# Patient Record
Sex: FEMALE | URBAN - METROPOLITAN AREA
[De-identification: names, ages, dates, MRNs, and addresses within clinical notes are randomized per-mention and may not be internally consistent; named-entity substitution may affect disease eponyms.]

---

## 2023-06-21 ENCOUNTER — APPOINTMENT (RX ONLY)
Dept: URBAN - METROPOLITAN AREA CLINIC 45 | Facility: CLINIC | Age: 73
Setting detail: DERMATOLOGY
End: 2023-06-21

## 2023-06-21 DIAGNOSIS — L20.89 OTHER ATOPIC DERMATITIS: ICD-10-CM

## 2023-06-21 DIAGNOSIS — L50.3 DERMATOGRAPHIC URTICARIA: ICD-10-CM

## 2023-06-21 PROBLEM — L20.84 INTRINSIC (ALLERGIC) ECZEMA: Status: ACTIVE | Noted: 2023-06-21

## 2023-06-21 PROCEDURE — ? TREATMENT REGIMEN

## 2023-06-21 PROCEDURE — 99203 OFFICE O/P NEW LOW 30 MIN: CPT

## 2023-06-21 PROCEDURE — ? PRESCRIPTION

## 2023-06-21 PROCEDURE — ? COUNSELING

## 2023-06-21 RX ORDER — HYDROCORTISONE ACETATE, IODOQUINOL 19; 10 MG/G; MG/G
CREAM TOPICAL
Qty: 30 | Refills: 12 | Status: ERX | COMMUNITY
Start: 2023-06-21

## 2023-06-21 RX ORDER — RUXOLITINIB 15 MG/G
CREAM TOPICAL
Qty: 60 | Refills: 3 | Status: ERX | COMMUNITY
Start: 2023-06-21

## 2023-06-21 RX ORDER — CLOBETASOL PROPIONATE 0.5 MG/G
AEROSOL, FOAM TOPICAL
Qty: 100 | Refills: 6 | Status: ERX | COMMUNITY
Start: 2023-06-21

## 2023-06-21 RX ADMIN — RUXOLITINIB: 15 CREAM TOPICAL at 00:00

## 2023-06-21 RX ADMIN — CLOBETASOL PROPIONATE: 0.5 AEROSOL, FOAM TOPICAL at 00:00

## 2023-06-21 RX ADMIN — HYDROCORTISONE ACETATE, IODOQUINOL: 19; 10 CREAM TOPICAL at 00:00

## 2023-06-21 ASSESSMENT — LOCATION ZONE DERM
LOCATION ZONE: FACE
LOCATION ZONE: ARM
LOCATION ZONE: HAND
LOCATION ZONE: SCALP
LOCATION ZONE: LEG

## 2023-06-21 ASSESSMENT — LOCATION SIMPLE DESCRIPTION DERM
LOCATION SIMPLE: RIGHT ELBOW
LOCATION SIMPLE: RIGHT ANKLE
LOCATION SIMPLE: LEFT ANKLE
LOCATION SIMPLE: LEFT HAND
LOCATION SIMPLE: LEFT ELBOW
LOCATION SIMPLE: RIGHT HAND
LOCATION SIMPLE: INFERIOR FOREHEAD
LOCATION SIMPLE: POSTERIOR SCALP

## 2023-06-21 ASSESSMENT — LOCATION DETAILED DESCRIPTION DERM
LOCATION DETAILED: LEFT RADIAL PALM
LOCATION DETAILED: LEFT ANKLE
LOCATION DETAILED: RIGHT ANKLE
LOCATION DETAILED: RIGHT ELBOW
LOCATION DETAILED: INFERIOR MID FOREHEAD
LOCATION DETAILED: LEFT ELBOW
LOCATION DETAILED: MID-OCCIPITAL SCALP
LOCATION DETAILED: RIGHT RADIAL PALM

## 2023-06-21 NOTE — PROCEDURE: TREATMENT REGIMEN
Plan: Location: body-face, hands,elbows, scalp,heels \\nTreatment: Opzelura 1.5%,Vytone 1.9%-1%, Tovet 0.05% foam\\nBSA: 7%\\nFailed: Tacrolimus, Protopic \\n\\n\\nPatient is here because she has possible eczema \\nPatient states that she is currently treating with Gold bond eczema relief ,and Cereva moisturizer \\nPatient states that she just recently relocated and is wanting treatment for her eczema \\n\\nAfter careful evaluation,advised the patient that there is some irritation to her skin and we will put her on a skin regimen to help improve her skin and alleviate the inflammation and irritation\\nDiscussed with patient that for her regimen she is to Take a double dose of generic oral antihistamine \\n             (Either Zyrtec,Allegra or Xyzal)\\n**In 3 weeks if hands have not improved go up to 3 oral antihistamine daily **\\Bj addition we will also send out some topicals to help with inflammation,\\n     Tovet Foam(Strong Steroid)- apply to body(hands,elbows,heels, scalp,etc.)\\n     Opzelura- apply to face in the morning \\n     Vytone (Mild steroid )- apply to face at night \\n\\nDiscussed with patient that If her skin has not improved we Will do a biopsy because her skin does show an indication of psoriasis so we will need to get a microscopic evaluation.\\n\\nEducated patient that eczema is an inflammatory condition triggered with stress, lack of sleep, and also has a genetic component\\nEducated patient to limit soap to only oily areas of the body to prevent stripping natural oils\\nRecommend using Cerave MC to help reestablish a healthy skin barrier\\nAlso recommended patient to take an antihistamine to help decrease histamine production\\n\\n\\nFollow up 4 weeks
Detail Level: Zone
Plan: Location: body\\n\\nDermatographism was drawn to detect histamine level. \\nPatient has high histamine level and advised to start taking Allegra BID, especially during allergy season.\\nF/u as needed

## 2023-07-19 ENCOUNTER — APPOINTMENT (RX ONLY)
Dept: URBAN - METROPOLITAN AREA CLINIC 45 | Facility: CLINIC | Age: 73
Setting detail: DERMATOLOGY
End: 2023-07-19

## 2023-07-19 DIAGNOSIS — L50.3 DERMATOGRAPHIC URTICARIA: ICD-10-CM

## 2023-07-19 DIAGNOSIS — L20.89 OTHER ATOPIC DERMATITIS: ICD-10-CM

## 2023-07-19 PROBLEM — D48.5 NEOPLASM OF UNCERTAIN BEHAVIOR OF SKIN: Status: ACTIVE | Noted: 2023-07-19

## 2023-07-19 PROCEDURE — ? TREATMENT REGIMEN

## 2023-07-19 PROCEDURE — 99213 OFFICE O/P EST LOW 20 MIN: CPT | Mod: 25

## 2023-07-19 PROCEDURE — ? BIOPSY BY SHAVE METHOD

## 2023-07-19 PROCEDURE — ? COUNSELING

## 2023-07-19 PROCEDURE — 11102 TANGNTL BX SKIN SINGLE LES: CPT

## 2023-07-19 PROCEDURE — ? PRESCRIPTION

## 2023-07-19 RX ORDER — RUXOLITINIB 15 MG/G
CREAM TOPICAL
Qty: 60 | Refills: 3 | Status: CANCELLED
Stop reason: CLARIF

## 2023-07-19 RX ORDER — HYDROCORTISONE ACETATE, IODOQUINOL 19; 10 MG/G; MG/G
CREAM TOPICAL
Qty: 30 | Refills: 12 | Status: CANCELLED
Stop reason: CLARIF

## 2023-07-19 RX ORDER — CLOBETASOL PROPIONATE 0.5 MG/G
AEROSOL, FOAM TOPICAL
Qty: 100 | Refills: 6 | Status: CANCELLED
Stop reason: CLARIF

## 2023-07-19 ASSESSMENT — LOCATION SIMPLE DESCRIPTION DERM
LOCATION SIMPLE: RIGHT ELBOW
LOCATION SIMPLE: POSTERIOR SCALP
LOCATION SIMPLE: RIGHT ANKLE
LOCATION SIMPLE: LEFT HAND
LOCATION SIMPLE: LEFT ELBOW
LOCATION SIMPLE: LEFT ANKLE
LOCATION SIMPLE: RIGHT HAND
LOCATION SIMPLE: INFERIOR FOREHEAD

## 2023-07-19 ASSESSMENT — LOCATION DETAILED DESCRIPTION DERM
LOCATION DETAILED: LEFT ANKLE
LOCATION DETAILED: RIGHT ANKLE
LOCATION DETAILED: LEFT RADIAL PALM
LOCATION DETAILED: RIGHT RADIAL PALM
LOCATION DETAILED: INFERIOR MID FOREHEAD
LOCATION DETAILED: LEFT ELBOW
LOCATION DETAILED: MID-OCCIPITAL SCALP
LOCATION DETAILED: RIGHT ELBOW

## 2023-07-19 ASSESSMENT — LOCATION ZONE DERM
LOCATION ZONE: ARM
LOCATION ZONE: HAND
LOCATION ZONE: SCALP
LOCATION ZONE: FACE
LOCATION ZONE: LEG

## 2023-07-19 NOTE — PROCEDURE: TREATMENT REGIMEN
Plan: Location: body-face, hands,elbows, scalp,heels \\nTreatment: Opzelura 1.5%,Vytone 1.9%-1%, Tovet 0.05% foam\\nBSA: 7%\\nFailed: Tacrolimus, Protopic \\n\\n\\nPictures Taken \\n7-19-23\\n\\n\\nPt is here for a follow up on her eczema.\\nShe states that since her Initial visit her eczema has resolved.\\nShe has been taking a double dose of Allegra daily.\\nAlong with using the topical prescriptions Vytone and Opzelura (compound) and  Clobetasol Foam.\\nOverall she comes in for further evaluation and treatment management.\\n\\nDiscussed with pt that upon further examination her eczema has resolved and does not currently present with any eczematous plaques.\\nToday, I will keep her on her current treatment regimen of Tovet Foam, Compounded topical of Opzelura, and Vytone.\\nInstructed her to continue taking her double dose of generic oral antihistamine Allegra.\\nEducated patient that eczema is an inflammatory condition triggered with stress, lack of sleep, and also has a genetic component\\nReiterated to pt to limit soap to only oily areas of the body to prevent stripping natural oils\\nRecommend using Cerave MC to help reestablish a healthy skin barrier\\n\\n\\nFollow up prn.
Detail Level: Zone
Plan: Location: body\\n\\nDermatographism was drawn to detect histamine level. \\nPatient has high histamine level and advised to start taking Allegra BID, especially during allergy season.\\nF/u as needed

## 2024-04-30 ENCOUNTER — RX ONLY (OUTPATIENT)
Age: 74
Setting detail: RX ONLY
End: 2024-04-30

## 2024-04-30 RX ORDER — RUXOLITINIB 15 MG/G
CREAM TOPICAL
Qty: 60 | Refills: 0 | Status: ERX

## 2024-11-07 ENCOUNTER — RX ONLY (OUTPATIENT)
Age: 74
Setting detail: RX ONLY
End: 2024-11-07

## 2024-11-07 ENCOUNTER — APPOINTMENT (RX ONLY)
Dept: URBAN - METROPOLITAN AREA CLINIC 45 | Facility: CLINIC | Age: 74
Setting detail: DERMATOLOGY
End: 2024-11-07

## 2024-11-07 DIAGNOSIS — H01.13 ECZEMATOUS DERMATITIS OF EYELID: ICD-10-CM

## 2024-11-07 DIAGNOSIS — L50.3 DERMATOGRAPHIC URTICARIA: ICD-10-CM

## 2024-11-07 DIAGNOSIS — L20.89 OTHER ATOPIC DERMATITIS: ICD-10-CM

## 2024-11-07 PROBLEM — H01.139 ECZEMATOUS DERMATITIS OF UNSPECIFIED EYE, UNSPECIFIED EYELID: Status: ACTIVE | Noted: 2024-11-07

## 2024-11-07 PROCEDURE — 99213 OFFICE O/P EST LOW 20 MIN: CPT

## 2024-11-07 PROCEDURE — ? COUNSELING

## 2024-11-07 PROCEDURE — ? TREATMENT REGIMEN

## 2024-11-07 RX ORDER — RUXOLITINIB 15 MG/G
CREAM TOPICAL
Qty: 60 | Refills: 6 | Status: ERX

## 2024-11-07 RX ORDER — CLOBETASOL PROPIONATE 0.5 MG/G
AEROSOL, FOAM TOPICAL
Qty: 100 | Refills: 6 | Status: ERX

## 2024-11-07 RX ORDER — HYDROCORTISONE ACETATE, IODOQUINOL 19; 10 MG/G; MG/G
CREAM TOPICAL
Qty: 30 | Refills: 12 | Status: ERX

## 2024-11-07 ASSESSMENT — LOCATION DETAILED DESCRIPTION DERM
LOCATION DETAILED: RIGHT ELBOW
LOCATION DETAILED: LEFT RADIAL PALM
LOCATION DETAILED: LEFT ANKLE
LOCATION DETAILED: LEFT ELBOW
LOCATION DETAILED: INFERIOR MID FOREHEAD
LOCATION DETAILED: MID-OCCIPITAL SCALP
LOCATION DETAILED: RIGHT RADIAL PALM
LOCATION DETAILED: RIGHT ANKLE

## 2024-11-07 ASSESSMENT — LOCATION SIMPLE DESCRIPTION DERM
LOCATION SIMPLE: RIGHT ELBOW
LOCATION SIMPLE: LEFT HAND
LOCATION SIMPLE: LEFT ELBOW
LOCATION SIMPLE: INFERIOR FOREHEAD
LOCATION SIMPLE: LEFT ANKLE
LOCATION SIMPLE: POSTERIOR SCALP
LOCATION SIMPLE: RIGHT ANKLE
LOCATION SIMPLE: RIGHT HAND

## 2024-11-07 ASSESSMENT — LOCATION ZONE DERM
LOCATION ZONE: ARM
LOCATION ZONE: SCALP
LOCATION ZONE: HAND
LOCATION ZONE: LEG
LOCATION ZONE: FACE

## 2024-11-07 NOTE — PROCEDURE: TREATMENT REGIMEN
Plan: Location: body-face, hands,elbows, scalp,heels \\nTreatment: Opzelura 1.5%,Vytone 1.9%-1%, Tovet 0.05% foam\\nBSA: 7%\\n\\nPt is here for a follow up on her eczema.\\nShe states that since her eczema has remained clear.\\nShe has been taking a double dose of Allegra daily.\\nAlong with using the topical prescriptions Vytone and Opzelura (compound) and  Clobetasol Foam.\\nOverall she comes in for further evaluation and treatment management.\\n\\nDiscussed with pt that upon further examination her eczema has resolved and does not currently present with any eczematous plaques.\\nToday, I will keep her on her current treatment regimen of Tovet Foam, Compounded topical of Opzelura, and Vytone.\\nInstructed her to continue taking her double dose of generic oral antihistamine Allegra.\\nEducated patient that eczema is an inflammatory condition triggered with stress, lack of sleep, and also has a genetic component\\nReiterated to pt to limit soap to only oily areas of the body to prevent stripping natural oils\\nRecommend using Cerave MC to help reestablish a healthy skin barrier\\n\\n\\nFollow up prn.
Detail Level: Zone
Plan: Location: body\\n\\nDermatographism was drawn to detect histamine level. \\nPatient has high histamine level and advised to start taking Allegra BID, especially during allergy season.\\nF/u as needed
Plan: Location: eyelids\\nPrescribe: Opzelura cream, Vytone topical\\n\\nPatient states he/she is very itchy and skin is thick and scaly.\\nPt states this has been bothering her for approx one month.\\nPt states she has been taking two Allegra daily \\nPt states she received steroid shot and 5 day course of prednisone from allergy doctor which showed improvement\\nPt states she still has Vytone and Opzelura from previous eczema treatment.\\nOverall pt is here for further evaluation and management \\n\\nDiscussed with patient this is an inflammatory condition of the skin, Normally onset with allergies or season changes, this condition is usually a genetic condition inherited by a parent. Discussed with pt that we will prescribe with Vytone cream or Elidel cream to help reduce inflammation.\\nDiscussed with pt to also use a good MC or HO such as CerVe afterward to help reestablish a healthy skin barrier.\\nDiscussed with patient to increase antihistamines up from 2 to 3 daily.\\n\\nPlan:\\n1. Apply Opzelura daily in the morning onto affected area of eyelids to decrease inflammation and irritation\\n2. Apply Vytone topical onto eyelids daily in the evening to soothe skin, follow with cerevae healing ointment\\n3. Increase to 3 antihistamines daily

## 2025-01-07 ENCOUNTER — RX ONLY (RX ONLY)
Age: 75
End: 2025-01-07

## 2025-01-07 RX ORDER — RUXOLITINIB 15 MG/G
CREAM TOPICAL
Qty: 60 | Refills: 3 | Status: ERX

## 2025-01-07 RX ORDER — CLOBETASOL PROPIONATE 0.5 MG/G
AEROSOL, FOAM TOPICAL
Qty: 100 | Refills: 6 | Status: CANCELLED
Stop reason: CLARIF

## 2025-03-20 ENCOUNTER — RX ONLY (RX ONLY)
Age: 75
End: 2025-03-20

## 2025-03-20 ENCOUNTER — APPOINTMENT (OUTPATIENT)
Dept: URBAN - METROPOLITAN AREA CLINIC 45 | Facility: CLINIC | Age: 75
Setting detail: DERMATOLOGY
End: 2025-03-20

## 2025-03-20 DIAGNOSIS — L50.3 DERMATOGRAPHIC URTICARIA: ICD-10-CM

## 2025-03-20 DIAGNOSIS — L20.89 OTHER ATOPIC DERMATITIS: ICD-10-CM

## 2025-03-20 PROBLEM — D48.5 NEOPLASM OF UNCERTAIN BEHAVIOR OF SKIN: Status: ACTIVE | Noted: 2025-03-20

## 2025-03-20 PROCEDURE — ? COUNSELING

## 2025-03-20 PROCEDURE — 11102 TANGNTL BX SKIN SINGLE LES: CPT

## 2025-03-20 PROCEDURE — ? TREATMENT REGIMEN

## 2025-03-20 PROCEDURE — ? BIOPSY BY SHAVE METHOD

## 2025-03-20 PROCEDURE — 99213 OFFICE O/P EST LOW 20 MIN: CPT | Mod: 25

## 2025-03-20 RX ORDER — HYDROCORTISONE ACETATE, IODOQUINOL 19; 10 MG/G; MG/G
CREAM TOPICAL
Qty: 30 | Refills: 12 | Status: ERX

## 2025-03-20 RX ORDER — CLOBETASOL PROPIONATE 0.5 MG/G
AEROSOL, FOAM TOPICAL
Qty: 100 | Refills: 6 | Status: ERX

## 2025-03-20 RX ORDER — RUXOLITINIB 15 MG/G
CREAM TOPICAL
Qty: 60 | Refills: 3 | Status: ERX

## 2025-03-20 ASSESSMENT — LOCATION SIMPLE DESCRIPTION DERM
LOCATION SIMPLE: LEFT ANKLE
LOCATION SIMPLE: INFERIOR FOREHEAD
LOCATION SIMPLE: LEFT HAND
LOCATION SIMPLE: RIGHT ELBOW
LOCATION SIMPLE: RIGHT HAND
LOCATION SIMPLE: POSTERIOR SCALP
LOCATION SIMPLE: LEFT ELBOW
LOCATION SIMPLE: RIGHT ANKLE

## 2025-03-20 ASSESSMENT — LOCATION DETAILED DESCRIPTION DERM
LOCATION DETAILED: RIGHT ELBOW
LOCATION DETAILED: RIGHT ANKLE
LOCATION DETAILED: LEFT ELBOW
LOCATION DETAILED: LEFT RADIAL PALM
LOCATION DETAILED: INFERIOR MID FOREHEAD
LOCATION DETAILED: LEFT ANKLE
LOCATION DETAILED: RIGHT RADIAL PALM
LOCATION DETAILED: MID-OCCIPITAL SCALP

## 2025-03-20 ASSESSMENT — LOCATION ZONE DERM
LOCATION ZONE: SCALP
LOCATION ZONE: FACE
LOCATION ZONE: LEG
LOCATION ZONE: HAND
LOCATION ZONE: ARM

## 2025-03-20 NOTE — PROCEDURE: TREATMENT REGIMEN
Plan: Location: body\\n\\nDermatographism was drawn to detect histamine level. \\nPatient has high histamine level and advised to start taking Allegra BID, especially during allergy season.\\nF/u as needed
Detail Level: Zone
Plan: Location: body-face, hands,elbows, scalp,heels \\nTreatment: Opzelura 1.5%,Vytone 1.9%-1%, Tovet 0.05% foam\\nBSA: 7%\\n\\n3/20/2025\\nPt is here for a follow up on her eczema\\nShe states her eczema has remained clear with the current regimen. She uses the tovet foam when her eczema flares up. \\nShe also uses the tofacitinib on her face along with the vytone. \\nShe has been taking a 1 Allegra daily.\\nOverall she is satisfied and comes in for further evaluation and treatment management.\\n\\nDiscussed with pt that upon further examination her eczema has resolved and does not currently present with any eczematous plaques.\\nToday, I will keep her on her current treatment regimen of Tovet Foam, Compounded topical of Opzelura, and Vytone.\\nInstructed her to continue taking her double dose of generic oral antihistamine Allegra.\\nEducated patient that eczema is an inflammatory condition triggered with stress, lack of sleep, and also has a genetic component\\nReiterated to pt to limit soap to only oily areas of the body to prevent stripping natural oils\\nRecommend using Cerave MC to help reestablish a healthy skin barrier\\n\\n\\nFollow up prn.
